# Patient Record
Sex: MALE | Race: WHITE | NOT HISPANIC OR LATINO | URBAN - METROPOLITAN AREA
[De-identification: names, ages, dates, MRNs, and addresses within clinical notes are randomized per-mention and may not be internally consistent; named-entity substitution may affect disease eponyms.]

---

## 2019-02-11 ENCOUNTER — EMERGENCY (EMERGENCY)
Facility: HOSPITAL | Age: 84
LOS: 1 days | Discharge: HOME | End: 2019-02-11
Attending: STUDENT IN AN ORGANIZED HEALTH CARE EDUCATION/TRAINING PROGRAM
Payer: COMMERCIAL

## 2019-02-11 VITALS
HEART RATE: 84 BPM | HEIGHT: 69 IN | RESPIRATION RATE: 16 BRPM | WEIGHT: 125 LBS | OXYGEN SATURATION: 96 % | DIASTOLIC BLOOD PRESSURE: 69 MMHG | TEMPERATURE: 97 F | SYSTOLIC BLOOD PRESSURE: 113 MMHG

## 2019-02-11 VITALS
SYSTOLIC BLOOD PRESSURE: 139 MMHG | TEMPERATURE: 98 F | DIASTOLIC BLOOD PRESSURE: 69 MMHG | OXYGEN SATURATION: 100 % | RESPIRATION RATE: 18 BRPM | HEART RATE: 49 BPM

## 2019-02-11 PROCEDURE — 99285 EMERGENCY DEPT VISIT HI MDM: CPT

## 2019-02-11 PROCEDURE — 72125 CT NECK SPINE W/O DYE: CPT | Mod: 26

## 2019-02-11 PROCEDURE — 73110 X-RAY EXAM OF WRIST: CPT | Mod: 26,LT

## 2019-02-11 PROCEDURE — 70450 CT HEAD/BRAIN W/O DYE: CPT | Mod: 26

## 2019-02-11 PROCEDURE — 71260 CT THORAX DX C+: CPT | Mod: 26

## 2019-02-11 PROCEDURE — 71046 X-RAY EXAM CHEST 2 VIEWS: CPT | Mod: 26

## 2019-02-11 PROCEDURE — 74177 CT ABD & PELVIS W/CONTRAST: CPT | Mod: 26

## 2019-02-11 NOTE — ED PROVIDER NOTE - PROGRESS NOTE DETAILS
patient ct negative for ptx. stable. cxr read likely artifact. endorses to patient wife who said she will pick patient up in the morning. patient wife endorses she wants his elquis given. ordered for patient. signed pending family to pick patient up

## 2019-02-11 NOTE — ED PROVIDER NOTE - OBJECTIVE STATEMENT
86 y.o on eliquis presenting s/p mechanical fall while getting out of car. patient denies loc, n, v, abd pain. family endorses patient has brusing to left wrist, patient denies pain to extremity, headache, n, v, vision change, focal weakness, numbness.

## 2019-02-11 NOTE — ED PROVIDER NOTE - NSFOLLOWUPINSTRUCTIONS_ED_ALL_ED_FT
Retrun if pain, any concerns.  See your doctor as soon as possible (within 1-2 days).   If you need further assistance for appointments you can contact the [facility] Coordinator at [phone]. In addition our outpatient [facility] is located at [address] [place] [address], tel: [phone].

## 2019-02-11 NOTE — ED PROVIDER NOTE - PHYSICAL EXAMINATION
bruising noted to left temporal skull  no ttp of facial bone  echimosis of left wrist  full rom  sensation intact in all extremity  5/5 str  no snuff box ttp

## 2019-02-11 NOTE — ED ADULT TRIAGE NOTE - CHIEF COMPLAINT QUOTE
Injury on l temporal area s/p trip and fall yesterday ,on po Eliquis ,Plavix and baby aspirin ,no loc at the time

## 2019-02-11 NOTE — ED PROVIDER NOTE - MEDICAL DECISION MAKING DETAILS
patient well appearing. no signs of distress. s/p mechanical fall. no laceration  will obtain ct head, cspine, xray, reassess

## 2019-02-11 NOTE — CONSULT NOTE ADULT - SUBJECTIVE AND OBJECTIVE BOX
86y Male with PMHx afib on eliquis presented to Ed after fall yesterday. Patient denies any chest pain, no shortness of breath no abdominal pain. Chest xray revealed small left pneumothorax. Pt is accompanied by wife and reports no LOC after fall or head injury.     pmhx: as above  pshx: none    No Known Allergies    T(C): 36.7 (02-11-19 @ 15:44), Max: 36.7 (02-11-19 @ 15:44)  HR: 89 (02-11-19 @ 15:44) (84 - 89)  BP: 119/71 (02-11-19 @ 15:44) (113/69 - 119/71)  RR: 16 (02-11-19 @ 15:44) (16 - 16)  SpO2: 98% (02-11-19 @ 15:44) (96% - 98%)  Wt(kg): --    Gen:A&O x3, NAD  SKin: no rashes, no jaundice  chest: lungs cta nitesh, no decreased breath sounds  Abdomen: soft, nontender, nondistended, no masses  Lower Extremities: no edema, no skin discoloration, no calf tenderness nitesh                          14.5   7.7   )-----------( 169      ( 11 Feb 2019 18:21 )             43.1   02-11    136  |  102  |  14  ----------------------------<  59<L>  4.1   |  26  |  0.70    Ca    8.2<L>      11 Feb 2019 18:21    TPro  6.9  /  Alb  3.2<L>  /  TBili  0.6  /  DBili  x   /  AST  15  /  ALT  21  /  AlkPhos  89  02-11  < from: Xray Chest 2 Views PA/Lat (02.11.19 @ 17:13) >    INTERPRETATION:  PROCEDURE: PA and lateral views of the chest.    CLINICAL INFORMATION: Chest pain.    COMPARISON: None.    FINDINGS:     The patient is status post sternotomy.    Lungs: There is a small left pneumothorax. Basilar atelectasis is noted.  Heart: There is cardiomegaly.  Mediastinum: The mediastinum is within normal limits.    IMPRESSION:    Small left pneumothorax.    Findings were discussed with Dr. Rebolledo by telephone on 2/11/2019 at 1808   hours.      < end of copied text >

## 2019-02-11 NOTE — CONSULT NOTE ADULT - ASSESSMENT
87yo male with left small pneumothorax s/p fall, on eliquis    1) recommend admission to ICU for monitoring considering pt is on eliquis  2) f/u cxr in am  3) no thoracic surgery intervention at this time  4) case and plan discussed with Dr. Stapleton and agrees

## 2019-02-11 NOTE — ED ADULT NURSE NOTE - OBJECTIVE STATEMENT
Injury on left temporal area s/p trip and fall yesterday. as per wife, Patient on po Eliquis ,Plavix and baby aspirin ,no loc at the time. No acute distress noted, denies chest pain, no shortness of breath indicated. Safety maintained.

## 2019-02-12 PROCEDURE — 73110 X-RAY EXAM OF WRIST: CPT

## 2019-02-12 PROCEDURE — 85027 COMPLETE CBC AUTOMATED: CPT

## 2019-02-12 PROCEDURE — 82962 GLUCOSE BLOOD TEST: CPT

## 2019-02-12 PROCEDURE — 99284 EMERGENCY DEPT VISIT MOD MDM: CPT | Mod: 25

## 2019-02-12 PROCEDURE — 74177 CT ABD & PELVIS W/CONTRAST: CPT

## 2019-02-12 PROCEDURE — 86900 BLOOD TYPING SEROLOGIC ABO: CPT

## 2019-02-12 PROCEDURE — 85730 THROMBOPLASTIN TIME PARTIAL: CPT

## 2019-02-12 PROCEDURE — 71260 CT THORAX DX C+: CPT

## 2019-02-12 PROCEDURE — 36415 COLL VENOUS BLD VENIPUNCTURE: CPT

## 2019-02-12 PROCEDURE — 72125 CT NECK SPINE W/O DYE: CPT

## 2019-02-12 PROCEDURE — 85610 PROTHROMBIN TIME: CPT

## 2019-02-12 PROCEDURE — 86850 RBC ANTIBODY SCREEN: CPT

## 2019-02-12 PROCEDURE — 71046 X-RAY EXAM CHEST 2 VIEWS: CPT

## 2019-02-12 PROCEDURE — 70450 CT HEAD/BRAIN W/O DYE: CPT

## 2019-02-12 PROCEDURE — 86901 BLOOD TYPING SEROLOGIC RH(D): CPT

## 2019-02-12 PROCEDURE — 80053 COMPREHEN METABOLIC PANEL: CPT

## 2019-02-12 RX ORDER — APIXABAN 2.5 MG/1
2.5 TABLET, FILM COATED ORAL ONCE
Qty: 0 | Refills: 0 | Status: COMPLETED | OUTPATIENT
Start: 2019-02-12 | End: 2019-02-12

## 2019-02-12 RX ADMIN — APIXABAN 2.5 MILLIGRAM(S): 2.5 TABLET, FILM COATED ORAL at 04:25

## 2019-02-26 NOTE — ED ADULT NURSE NOTE - NSINTERVENTIONOPT_GEN_ALL_ED
Please call this patient. Inform them that his sugars and cholesterol were high. His thyroid function was low. All of this was to be expected as he has taken no medications in a few years and should improve as long as he takes the medications as prescribed.  His STD screen was normal.     Thank you     Dr. Gunner Jones [facility]

## 2022-01-11 ENCOUNTER — APPOINTMENT (OUTPATIENT)
Dept: UROLOGY | Facility: CLINIC | Age: 87
End: 2022-01-11
Payer: MEDICARE

## 2022-01-11 VITALS
BODY MASS INDEX: 19.63 KG/M2 | WEIGHT: 115 LBS | DIASTOLIC BLOOD PRESSURE: 59 MMHG | TEMPERATURE: 97.6 F | OXYGEN SATURATION: 96 % | SYSTOLIC BLOOD PRESSURE: 103 MMHG | HEIGHT: 64 IN | HEART RATE: 80 BPM

## 2022-01-11 DIAGNOSIS — Z87.891 PERSONAL HISTORY OF NICOTINE DEPENDENCE: ICD-10-CM

## 2022-01-11 DIAGNOSIS — Z86.79 PERSONAL HISTORY OF OTHER DISEASES OF THE CIRCULATORY SYSTEM: ICD-10-CM

## 2022-01-11 DIAGNOSIS — D49.0 NEOPLASM OF UNSPECIFIED BEHAVIOR OF DIGESTIVE SYSTEM: ICD-10-CM

## 2022-01-11 DIAGNOSIS — Z98.890 OTHER SPECIFIED POSTPROCEDURAL STATES: ICD-10-CM

## 2022-01-11 DIAGNOSIS — Z80.41 FAMILY HISTORY OF MALIGNANT NEOPLASM OF OVARY: ICD-10-CM

## 2022-01-11 DIAGNOSIS — R33.9 RETENTION OF URINE, UNSPECIFIED: ICD-10-CM

## 2022-01-11 DIAGNOSIS — K46.9 UNSPECIFIED ABDOMINAL HERNIA W/OUT OBSTRUCTION OR GANGRENE: ICD-10-CM

## 2022-01-11 DIAGNOSIS — N40.1 BENIGN PROSTATIC HYPERPLASIA WITH LOWER URINARY TRACT SYMPMS: ICD-10-CM

## 2022-01-11 DIAGNOSIS — S92.919A UNSPECIFIED FRACTURE OF UNSPECIFIED TOE(S), INITIAL ENCOUNTER FOR CLOSED FRACTURE: ICD-10-CM

## 2022-01-11 DIAGNOSIS — Z95.1 PRESENCE OF AORTOCORONARY BYPASS GRAFT: ICD-10-CM

## 2022-01-11 DIAGNOSIS — N13.8 BENIGN PROSTATIC HYPERPLASIA WITH LOWER URINARY TRACT SYMPMS: ICD-10-CM

## 2022-01-11 PROCEDURE — 99204 OFFICE O/P NEW MOD 45 MIN: CPT

## 2022-01-11 NOTE — HISTORY OF PRESENT ILLNESS
[FreeTextEntry1] : 89 year-old gentleman presents to the office with c/o of blood in the urine. He is accompanied by his wife. Patient was recently hospitalized for pneumonia and UTI at [facility] two times in December and once in the ER at [facility]. Patient's wife reports that he has had blood in the urine since Friday, 1/7. Patient reports intermittency, hesitancy, strong stream. Denies dysuria, flank pain and suprapubic pain. Daytime frequency every 2 hours; nocturia x 1. Patient and wife was told when he was in the hospital in December that he may have passed a kidney stone. Denies previous history of UTIs prior to December admission to the hospital.  Former smoker, quite over 30 years ago. \par \par Patient was started on Cephlelexin 100 mg but then was switched to Doxy for 7 days which was completed on 12/20/21. \par \par Patient had CT abd/pelv with IV contrast performed at [facility] during the 12/17 ER visit. \par \par CT abd/pelv w/ IV contrast: thickened bladder with trabeculation and suggestion of calculi, possibly within diverticula. No upper tract collecting system dilation or calculi clearly suggested.\par \par PVR - 89 ml.\par

## 2022-01-11 NOTE — ASSESSMENT
[FreeTextEntry1] : \par Impression/plan: 89 year-old gentleman with hx of BPH presents to the office with gross hematuria. \par \par PVR - 89 ml. \par \par 1. Urine c+s; Urine cytology: r/o UTI and abnormal cells in the urinary tract. \par 2. F/u Cystoscopy. \par 3. Review CT Urogram. \par

## 2022-01-11 NOTE — PHYSICAL EXAM
[General Appearance - Well Developed] : well developed [General Appearance - Well Nourished] : well nourished [Normal Appearance] : normal appearance [Well Groomed] : well groomed [General Appearance - In No Acute Distress] : no acute distress [Edema] : no peripheral edema [Abdomen Soft] : soft [Abdomen Tenderness] : non-tender [Costovertebral Angle Tenderness] : no ~M costovertebral angle tenderness [Normal Station and Gait] : the gait and station were normal for the patient's age [] : no rash [Oriented To Time, Place, And Person] : oriented to person, place, and time [Affect] : the affect was normal [Mood] : the mood was normal [Not Anxious] : not anxious

## 2022-01-12 ENCOUNTER — NON-APPOINTMENT (OUTPATIENT)
Age: 87
End: 2022-01-12

## 2022-01-12 LAB — URINE CYTOLOGY: NORMAL

## 2022-01-18 LAB — BACTERIA UR CULT: ABNORMAL

## 2022-01-18 RX ORDER — CIPROFLOXACIN HYDROCHLORIDE 500 MG/1
500 TABLET, FILM COATED ORAL
Qty: 6 | Refills: 0 | Status: ACTIVE | COMMUNITY
Start: 2022-01-18 | End: 1900-01-01

## 2022-01-19 ENCOUNTER — APPOINTMENT (OUTPATIENT)
Dept: UROLOGY | Facility: CLINIC | Age: 87
End: 2022-01-19
Payer: MEDICARE

## 2022-01-21 ENCOUNTER — APPOINTMENT (OUTPATIENT)
Dept: UROLOGY | Facility: CLINIC | Age: 87
End: 2022-01-21
Payer: MEDICARE

## 2022-01-21 VITALS
HEART RATE: 98 BPM | SYSTOLIC BLOOD PRESSURE: 134 MMHG | DIASTOLIC BLOOD PRESSURE: 87 MMHG | TEMPERATURE: 97.6 F | OXYGEN SATURATION: 95 %

## 2022-01-21 DIAGNOSIS — R31.0 GROSS HEMATURIA: ICD-10-CM

## 2022-01-21 LAB
BILIRUB UR QL STRIP: NORMAL
CLARITY UR: CLEAR
COLLECTION METHOD: NORMAL
GLUCOSE UR-MCNC: NORMAL
HCG UR QL: 0.2 EU/DL
HGB UR QL STRIP.AUTO: NORMAL
KETONES UR-MCNC: NORMAL
LEUKOCYTE ESTERASE UR QL STRIP: NORMAL
NITRITE UR QL STRIP: NORMAL
PH UR STRIP: 7
PROT UR STRIP-MCNC: NORMAL
SP GR UR STRIP: 1.01

## 2022-01-21 PROCEDURE — 52000 CYSTOURETHROSCOPY: CPT

## 2022-01-21 PROCEDURE — 81003 URINALYSIS AUTO W/O SCOPE: CPT | Mod: QW

## 2023-11-22 NOTE — ED PROVIDER NOTE - PSYCHIATRIC, MLM
Alert and oriented to person, place, time/situation. normal mood and affect. no apparent risk to self or others. Olanzapine Pregnancy And Lactation Text: This medication is pregnancy category C.   There are no adequate and well controlled trials with olanzapine in pregnant females.  Olanzapine should be used during pregnancy only if the potential benefit justifies the potential risk to the fetus.   In a study in lactating healthy women, olanzapine was excreted in breast milk.  It is recommended that women taking olanzapine should not breast feed.

## 2024-10-15 ENCOUNTER — NON-APPOINTMENT (OUTPATIENT)
Age: 89
End: 2024-10-15